# Patient Record
Sex: MALE | Race: OTHER
[De-identification: names, ages, dates, MRNs, and addresses within clinical notes are randomized per-mention and may not be internally consistent; named-entity substitution may affect disease eponyms.]

---

## 2018-07-06 ENCOUNTER — HOSPITAL ENCOUNTER (INPATIENT)
Dept: HOSPITAL 54 - GPS | Age: 63
LOS: 1 days | Discharge: ANOTHER HOSPITAL | DRG: 885 | End: 2018-07-07
Attending: PSYCHIATRY & NEUROLOGY | Admitting: PSYCHIATRY & NEUROLOGY
Payer: MEDICARE

## 2018-07-06 VITALS — HEIGHT: 68 IN | WEIGHT: 172 LBS | BODY MASS INDEX: 26.07 KG/M2

## 2018-07-06 VITALS — SYSTOLIC BLOOD PRESSURE: 120 MMHG | DIASTOLIC BLOOD PRESSURE: 68 MMHG

## 2018-07-06 DIAGNOSIS — F39: Primary | ICD-10-CM

## 2018-07-06 DIAGNOSIS — Z72.0: ICD-10-CM

## 2018-07-06 DIAGNOSIS — G40.909: ICD-10-CM

## 2018-07-06 DIAGNOSIS — F03.90: ICD-10-CM

## 2018-07-06 DIAGNOSIS — Z73.6: ICD-10-CM

## 2018-07-06 DIAGNOSIS — F29: ICD-10-CM

## 2018-07-06 DIAGNOSIS — Z91.81: ICD-10-CM

## 2018-07-06 DIAGNOSIS — F20.9: ICD-10-CM

## 2018-07-06 PROCEDURE — Z7610: HCPCS

## 2018-07-06 NOTE — NUR
Admitted a 61 y/o male from [facility], on 5250 hold, based on hold patient has hx. of 
multiple inpatient hospitalization for psychotic d/o and presents with disorganized 
thinking, grandiose delusions. He is currently unable to state how he would obtain food or 
clothing. Patient also hides under the sheets of hospital bed and refused to speak with 
medical team. Patient admitting Dx. of psychosis and medical Dx. Rhabdomyolysis, blunt head 
trauma and AMS. Upon face to face evaluation, patient appeared alert and oriented x 3, 
anxious, disorganized, guarded, paranoid, with episodes of poor concentration and confusion, 
jumping from topic to topic, policies and procedure explained, cooperative on head to toe 
assessment and pictures, refused to sign paper works, patient has no sob, no acute distress, 
breathing even and unlabored, denies pain and discomfort, ambulatory with unsteady gait, 
belongings inspected for contraband checking, notified Dr. Abdul of the admission. Kept 
clean, dry and comfortable. Will continue to monitor [id number] for safety

## 2018-07-07 ENCOUNTER — HOSPITAL ENCOUNTER (INPATIENT)
Dept: HOSPITAL 54 - TELE1 | Age: 63
LOS: 3 days | Discharge: PSYCHIATRIC OR SUBSTANCE USE FACILITY | DRG: 101 | End: 2018-07-10
Attending: NURSE PRACTITIONER | Admitting: NURSE PRACTITIONER
Payer: MEDICARE

## 2018-07-07 VITALS — DIASTOLIC BLOOD PRESSURE: 62 MMHG | SYSTOLIC BLOOD PRESSURE: 106 MMHG

## 2018-07-07 VITALS — DIASTOLIC BLOOD PRESSURE: 60 MMHG | SYSTOLIC BLOOD PRESSURE: 101 MMHG

## 2018-07-07 VITALS — SYSTOLIC BLOOD PRESSURE: 96 MMHG | DIASTOLIC BLOOD PRESSURE: 58 MMHG

## 2018-07-07 VITALS — DIASTOLIC BLOOD PRESSURE: 68 MMHG | SYSTOLIC BLOOD PRESSURE: 120 MMHG

## 2018-07-07 VITALS — DIASTOLIC BLOOD PRESSURE: 62 MMHG | SYSTOLIC BLOOD PRESSURE: 100 MMHG

## 2018-07-07 VITALS — SYSTOLIC BLOOD PRESSURE: 92 MMHG | DIASTOLIC BLOOD PRESSURE: 46 MMHG

## 2018-07-07 VITALS — BODY MASS INDEX: 26.07 KG/M2 | WEIGHT: 172 LBS | HEIGHT: 68 IN

## 2018-07-07 VITALS — DIASTOLIC BLOOD PRESSURE: 73 MMHG | SYSTOLIC BLOOD PRESSURE: 144 MMHG

## 2018-07-07 DIAGNOSIS — E87.1: ICD-10-CM

## 2018-07-07 DIAGNOSIS — F03.90: ICD-10-CM

## 2018-07-07 DIAGNOSIS — Z87.828: ICD-10-CM

## 2018-07-07 DIAGNOSIS — Z73.6: ICD-10-CM

## 2018-07-07 DIAGNOSIS — I10: ICD-10-CM

## 2018-07-07 DIAGNOSIS — T42.6X5A: ICD-10-CM

## 2018-07-07 DIAGNOSIS — G40.909: Primary | ICD-10-CM

## 2018-07-07 DIAGNOSIS — Z72.0: ICD-10-CM

## 2018-07-07 DIAGNOSIS — F39: ICD-10-CM

## 2018-07-07 DIAGNOSIS — F20.0: ICD-10-CM
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
## 2018-07-07 DIAGNOSIS — F29: ICD-10-CM

## 2018-07-07 LAB
ALBUMIN SERPL BCP-MCNC: 3.3 G/DL (ref 3.4–5)
ALBUMIN SERPL BCP-MCNC: 3.4 G/DL (ref 3.4–5)
ALP SERPL-CCNC: 89 U/L (ref 46–116)
ALP SERPL-CCNC: 91 U/L (ref 46–116)
ALT SERPL W P-5'-P-CCNC: 16 U/L (ref 12–78)
ALT SERPL W P-5'-P-CCNC: 18 U/L (ref 12–78)
APPEARANCE UR: CLEAR
AST SERPL W P-5'-P-CCNC: 14 U/L (ref 15–37)
AST SERPL W P-5'-P-CCNC: 17 U/L (ref 15–37)
BASOPHILS # BLD AUTO: 0.1 /CMM (ref 0–0.2)
BASOPHILS # BLD AUTO: 0.1 /CMM (ref 0–0.2)
BASOPHILS NFR BLD AUTO: 0.5 % (ref 0–2)
BASOPHILS NFR BLD AUTO: 0.9 % (ref 0–2)
BILIRUB SERPL-MCNC: 0.4 MG/DL (ref 0.2–1)
BILIRUB SERPL-MCNC: 0.7 MG/DL (ref 0.2–1)
BILIRUB UR QL STRIP: NEGATIVE
BUN SERPL-MCNC: 15 MG/DL (ref 7–18)
BUN SERPL-MCNC: 18 MG/DL (ref 7–18)
CALCIUM SERPL-MCNC: 8.9 MG/DL (ref 8.5–10.1)
CALCIUM SERPL-MCNC: 9.2 MG/DL (ref 8.5–10.1)
CHLORIDE SERPL-SCNC: 97 MMOL/L (ref 98–107)
CHLORIDE SERPL-SCNC: 98 MMOL/L (ref 98–107)
CHOLEST SERPL-MCNC: 138 MG/DL (ref ?–200)
CHOLEST SERPL-MCNC: 148 MG/DL (ref ?–200)
CK MB SERPL-MCNC: 0.7 NG/ML (ref 0–3.6)
CO2 SERPL-SCNC: 19 MMOL/L (ref 21–32)
CO2 SERPL-SCNC: 27 MMOL/L (ref 21–32)
COLOR UR: YELLOW
CREAT SERPL-MCNC: 0.7 MG/DL (ref 0.6–1.3)
CREAT SERPL-MCNC: 0.9 MG/DL (ref 0.6–1.3)
EOSINOPHIL NFR BLD AUTO: 0.5 % (ref 0–6)
EOSINOPHIL NFR BLD AUTO: 3.6 % (ref 0–6)
GLUCOSE SERPL-MCNC: 110 MG/DL (ref 74–106)
GLUCOSE SERPL-MCNC: 115 MG/DL (ref 74–106)
GLUCOSE UR STRIP-MCNC: NEGATIVE MG/DL
HCT VFR BLD AUTO: 40 % (ref 39–51)
HCT VFR BLD AUTO: 42 % (ref 39–51)
HDLC SERPL-MCNC: 32 MG/DL (ref 40–60)
HDLC SERPL-MCNC: 36 MG/DL (ref 40–60)
HGB BLD-MCNC: 13.2 G/DL (ref 13.5–17.5)
HGB BLD-MCNC: 14.2 G/DL (ref 13.5–17.5)
HGB UR QL STRIP: NEGATIVE ERY/UL
KETONES UR STRIP-MCNC: NEGATIVE MG/DL
LDLC SERPL DIRECT ASSAY-MCNC: 101 MG/DL (ref 0–99)
LDLC SERPL DIRECT ASSAY-MCNC: 93 MG/DL (ref 0–99)
LEUKOCYTE ESTERASE UR QL STRIP: NEGATIVE
LYMPHOCYTES NFR BLD AUTO: 1.3 /CMM (ref 0.8–4.8)
LYMPHOCYTES NFR BLD AUTO: 11.2 % (ref 20–44)
LYMPHOCYTES NFR BLD AUTO: 38.2 % (ref 20–44)
LYMPHOCYTES NFR BLD AUTO: 4.5 /CMM (ref 0.8–4.8)
MAGNESIUM SERPL-MCNC: 2 MG/DL (ref 1.8–2.4)
MCH RBC QN AUTO: 33 PG (ref 26–33)
MCH RBC QN AUTO: 35 PG (ref 26–33)
MCHC RBC AUTO-ENTMCNC: 33 G/DL (ref 31–36)
MCHC RBC AUTO-ENTMCNC: 34 G/DL (ref 31–36)
MCV RBC AUTO: 101 FL (ref 80–96)
MCV RBC AUTO: 103 FL (ref 80–96)
MONOCYTES NFR BLD AUTO: 0.8 /CMM (ref 0.1–1.3)
MONOCYTES NFR BLD AUTO: 1.1 /CMM (ref 0.1–1.3)
MONOCYTES NFR BLD AUTO: 6.4 % (ref 2–12)
MONOCYTES NFR BLD AUTO: 9.5 % (ref 2–12)
NEUTROPHILS # BLD AUTO: 5.6 /CMM (ref 1.8–8.9)
NEUTROPHILS # BLD AUTO: 9.6 /CMM (ref 1.8–8.9)
NEUTROPHILS NFR BLD AUTO: 47.8 % (ref 43–81)
NEUTROPHILS NFR BLD AUTO: 81.4 % (ref 43–81)
NITRITE UR QL STRIP: NEGATIVE
PH UR STRIP: 6 [PH] (ref 5–8)
PHOSPHATE SERPL-MCNC: 3.1 MG/DL (ref 2.5–4.9)
PLATELET # BLD AUTO: 230 /CMM (ref 150–450)
PLATELET # BLD AUTO: 242 /CMM (ref 150–450)
POTASSIUM SERPL-SCNC: 4.3 MMOL/L (ref 3.5–5.1)
POTASSIUM SERPL-SCNC: 4.6 MMOL/L (ref 3.5–5.1)
PROT SERPL-MCNC: 6.5 G/DL (ref 6.4–8.2)
PROT SERPL-MCNC: 7 G/DL (ref 6.4–8.2)
PROT UR QL STRIP: NEGATIVE MG/DL
RBC # BLD AUTO: 3.94 MIL/UL (ref 4.5–6)
RBC # BLD AUTO: 4.09 MIL/UL (ref 4.5–6)
RDW COEFFICIENT OF VARIATION: 13.2 (ref 11.5–15)
RDW COEFFICIENT OF VARIATION: 13.3 (ref 11.5–15)
SODIUM SERPL-SCNC: 129 MMOL/L (ref 136–145)
SODIUM SERPL-SCNC: 131 MMOL/L (ref 136–145)
TRIGL SERPL-MCNC: 70 MG/DL (ref 30–150)
TRIGL SERPL-MCNC: 88 MG/DL (ref 30–150)
TSH SERPL DL<=0.005 MIU/L-ACNC: 1.08 UIU/ML (ref 0.36–3.74)
UROBILINOGEN UR STRIP-MCNC: 0.2 EU/DL
WBC NRBC COR # BLD AUTO: 11.7 K/UL (ref 4.3–11)
WBC NRBC COR # BLD AUTO: 11.9 K/UL (ref 4.3–11)

## 2018-07-07 RX ADMIN — DIVALPROEX SODIUM SCH MG: 500 TABLET, DELAYED RELEASE ORAL at 16:30

## 2018-07-07 RX ADMIN — SODIUM CHLORIDE SCH MLS/HR: 9 INJECTION, SOLUTION INTRAVENOUS at 19:38

## 2018-07-07 NOTE — NUR
GPS RN NOTE:



PATIENT NOTED WITH EPISODE OF SEIZURE  LASTED 3 MINUTES, V/S YY=465/80 P=57 R=22 O2 SAT 96% 
ROOM AIR, SAFETY PRECAUTIONS OBSERVED, PATIENT ABLE TO CONVERSE AFTER. PATIENT HAD A HX. OF 
SEIZURE.  NOTIFIED DR. MADSION  AND ORDER TO CONTINUE DEPAKOTE 500MG PO BID NOTED AND 
CARRIED OUT. WILL CONTINUE TO MONITOR

-------------------------------------------------------------------------------

Addendum: 07/07/18 at 0658 by PRESTON HUMPHREY II, RN

-------------------------------------------------------------------------------

ADDENDUM:

RN SUPERVISOR AWARE

## 2018-07-07 NOTE — NUR
[facility]AL[facility] RN NOTES



RECEIVED PT FROM GPS, AOX3, PER NICOLE NP, ADMIT TO [facility] DX SEZURE. PT HAD 1 EPISODE OF 
SEIZURE AN HOUR AGO, ON RA, AO X 3, AMBULATORY, DENIES PAIN, ON TELEMONITOR HR SR HR 83, 
STARTED IV TO RT HAND G20, FLUSHES WELL, SITE CLEAR, REGULAR DIET, REFUSE PHOTO OF SKIN 
ISSUES AND TOTAL BODY CHECK. ON 5250 HOLD [EXP 07/20], SITTER AT BEDSIDE. UNIT ORIENTATION 
DONE AND USE OF CALL LIGHT, BED LOW LOCKED, FALL PRECAUTION, SEIZURE PRECAUTION, WILL CONT 
TO MONITOR.

## 2018-07-07 NOTE — NUR
TELE RN CLOSING NOTES



PT IN BED, RESTING COMFORTABLY, FROM GPS, AOX3, PER NICOLE NP, AMBULATORY, DENIES PAIN, ON 
TELEMONITOR HR SR HR 60, SRT HAND G20 IVHL, FLUSHES WELL, SITE CLEAR, REGULAR DIET, 
INDEPENDENT OF BED MOBILITY, ON 5250 HOLD [EXP 07/20], SITTER AT BEDSIDE. CALL LIGHT WITHIN 
REACH, BED LOW LOCKED, FALL PRECAUTION, SEIZURE PRECAUTION, ALL NEEDS MET AT THIS TIME. WILL 
ENDORSE TO NEXT SHIFT FOR BRANDAN.

## 2018-07-07 NOTE — NUR
GPS/VICENTE SHEPHERD NP ASSESSED THE PT FOLLOWING SEIZURE EPISODE WITH RAPID RESPONSE. NEW ORDERS 
GIVEN AND CARRIED OUT.

## 2018-07-07 NOTE — NUR
GPS/RN PT TRANSFERRED TO [facility] REPORT GIVEN TO NATE ZHANG AT THE BED SIDE. PT IS A/O NO ACUTE 
DISTRESS NO SEIZURES. VSS. ORIGINAL HOLDS GIVEN TO CHARGE NURSE/UNIT SECRETARY.

## 2018-07-07 NOTE — NUR
DR. SPRINGER NOTIFIED ABOUT THE PT. TO TRANSFER TO [facility] AND ORDERED TO D/C PT. TO [facility] AND TO 
CONTINUE ON 14 DAY HOLD.

## 2018-07-08 VITALS — DIASTOLIC BLOOD PRESSURE: 60 MMHG | SYSTOLIC BLOOD PRESSURE: 114 MMHG

## 2018-07-08 VITALS — SYSTOLIC BLOOD PRESSURE: 101 MMHG | DIASTOLIC BLOOD PRESSURE: 62 MMHG

## 2018-07-08 VITALS — SYSTOLIC BLOOD PRESSURE: 114 MMHG | DIASTOLIC BLOOD PRESSURE: 59 MMHG

## 2018-07-08 VITALS — DIASTOLIC BLOOD PRESSURE: 75 MMHG | SYSTOLIC BLOOD PRESSURE: 115 MMHG

## 2018-07-08 VITALS — DIASTOLIC BLOOD PRESSURE: 50 MMHG | SYSTOLIC BLOOD PRESSURE: 95 MMHG

## 2018-07-08 LAB
BASOPHILS # BLD AUTO: 0 /CMM (ref 0–0.2)
BASOPHILS NFR BLD AUTO: 0.4 % (ref 0–2)
BUN SERPL-MCNC: 12 MG/DL (ref 7–18)
CALCIUM SERPL-MCNC: 8.9 MG/DL (ref 8.5–10.1)
CHLORIDE SERPL-SCNC: 100 MMOL/L (ref 98–107)
CO2 SERPL-SCNC: 25 MMOL/L (ref 21–32)
CREAT SERPL-MCNC: 0.6 MG/DL (ref 0.6–1.3)
EOSINOPHIL NFR BLD AUTO: 1.5 % (ref 0–6)
GLUCOSE SERPL-MCNC: 98 MG/DL (ref 74–106)
HCT VFR BLD AUTO: 38 % (ref 39–51)
HGB BLD-MCNC: 12.8 G/DL (ref 13.5–17.5)
LYMPHOCYTES NFR BLD AUTO: 2.1 /CMM (ref 0.8–4.8)
LYMPHOCYTES NFR BLD AUTO: 24.5 % (ref 20–44)
MAGNESIUM SERPL-MCNC: 2.2 MG/DL (ref 1.8–2.4)
MCH RBC QN AUTO: 34 PG (ref 26–33)
MCHC RBC AUTO-ENTMCNC: 34 G/DL (ref 31–36)
MCV RBC AUTO: 102 FL (ref 80–96)
MONOCYTES NFR BLD AUTO: 0.9 /CMM (ref 0.1–1.3)
MONOCYTES NFR BLD AUTO: 10.2 % (ref 2–12)
NEUTROPHILS # BLD AUTO: 5.5 /CMM (ref 1.8–8.9)
NEUTROPHILS NFR BLD AUTO: 63.4 % (ref 43–81)
PHOSPHATE SERPL-MCNC: 3.4 MG/DL (ref 2.5–4.9)
PLATELET # BLD AUTO: 226 /CMM (ref 150–450)
POTASSIUM SERPL-SCNC: 4.4 MMOL/L (ref 3.5–5.1)
RBC # BLD AUTO: 3.73 MIL/UL (ref 4.5–6)
RDW COEFFICIENT OF VARIATION: 13.2 (ref 11.5–15)
SODIUM SERPL-SCNC: 133 MMOL/L (ref 136–145)
VALPROATE SERPL-MCNC: 36 UG/ML (ref 50–100)
WBC NRBC COR # BLD AUTO: 8.7 K/UL (ref 4.3–11)

## 2018-07-08 RX ADMIN — NICOTINE SCH MG: 14 PATCH TRANSDERMAL at 08:50

## 2018-07-08 RX ADMIN — SODIUM CHLORIDE SCH MLS/HR: 9 INJECTION, SOLUTION INTRAVENOUS at 08:49

## 2018-07-08 RX ADMIN — SODIUM CHLORIDE SCH MLS/HR: 9 INJECTION, SOLUTION INTRAVENOUS at 22:56

## 2018-07-08 RX ADMIN — OLANZAPINE SCH MG: 5 TABLET, ORALLY DISINTEGRATING ORAL at 21:09

## 2018-07-08 RX ADMIN — DIVALPROEX SODIUM SCH MG: 500 TABLET, DELAYED RELEASE ORAL at 16:06

## 2018-07-08 RX ADMIN — DIVALPROEX SODIUM SCH MG: 500 TABLET, DELAYED RELEASE ORAL at 08:49

## 2018-07-08 RX ADMIN — Medication SCH MG: at 08:50

## 2018-07-08 NOTE — NUR
RN TELE NOTE 



PT ATTEMPTING TO HIT SITTER, REBECCA KRISHNAMURTHY CALLED TO PLACE PATIENT BACK IN BED, CALL TO DR MADISON FOR FURTHER ORDERS, JHONNY SOFT WRIST RESTRAINTS IN PLACE FOR PT SAFETY AND ATIVAN 
1MG PRN FOR SEIZURE ADDED.

## 2018-07-08 NOTE — NUR
TELE RN INITIAL NOTES



RECEIVED PT  IN BED, RESTING COMFORTABLY. PT IS A/O X2. RESPIRATIONS ARE EVEN AND UNLABORED, 
DENIES SOB. NO FACIAL GRIMACING OR MOANING NOTED. IV ACCESS INTACT, NO INFILTRATION NOTED, 
DRESSING KEPT CLEAN AND DRY. RM ASSIGNED TO 1:1 SITTER, NO RESTRAINS APPLIED AT THIS TIME. 
SAFETY MEASURES ARE IN PLACE. WILL CONTINUE TO MONITOR THROUGHOUT SHIFT.

## 2018-07-08 NOTE — NUR
TELE RN CLOSING NOTES



ALL DUE MEDS GIVEN, NEEDS MET AND RENDERED. PT A/O X2-3, AFEBRILE. RESPIRATIONS ARE EVEN AND 
UNLABORED, NOT IN ANY ACUTE DISTRESS NOTED. IV SITE IS INTACT, NO INFILTRATION NOTED. IV 
FLUIDS RUNNING AND TOLERATING WELL. PT REMAINS COOPERATIVE AND FOLLOWS SIMPLE INSTRUCTIONS. 
RESTRAINTS HAVE BEEN REMOVED ONE HAND AT A TIME. NO SKIN ISSUES NOTED. SAFETY MEASURES ARE 
IN PLACE. SITTER AT BEDSIDE FOR MONITORING. WILL ENDORSE TO NEXT SHIFT FOR CONTINUITY OF 
CARE.

## 2018-07-08 NOTE — NUR
TELE RN OPENING NOTES



RECEIVED PT SITTING UPRIGHT IN BED, RESTING COMFORTABLY. PT IS A/O X2. RESPIRATIONS ARE EVEN 
AND UNLABORED, NOT IN ANY ACUTE DISTRESS NOTED. NO FACIAL GRIMACING OR MOANING NOTED. IV 
ACCESS INTACT, NO INFILTRATION NOTED, DRESSING KEPT CLEAN AND DRY. BILATERAL WIRST RESTRAINS 
NOTED SECURE. WILL REPOSITION PER PROTOCOL. SAFETY MEASURES ARE IN PLACE. WILL CONTINUE TO 
MONITOR THROUGHOUT SHIFT.

## 2018-07-08 NOTE — NUR
RN TELE NOTE 



PT IS STRIKING OUT, VERBALLY ABUSIVE TOWARDS STAFF, ATTEMPTING TO GET OUT OF BED, TALKING TO 
SELF, REDIRECTED AS NEEDED, NEEDS CONSTANT REINFORCEMENT SITTER AT BEDSIDE.

## 2018-07-08 NOTE — NUR
TELE RN NOTES



PT IS NOT IN ANY APPARENT DISTRESS AT THIS TIME. PT REMAINS COOPERATIVE. WRIST RESTRAINTS 
ARE REMOVED ONE AT A TIME FOR GOOD CIRCULATION AND REFRAIN FROM ANY SKIN ISSUES. SITTER AT 
BEDSIDE. WILL CONTINUE TO MONITOR THROUGHOUT SHIFT.

## 2018-07-09 VITALS — SYSTOLIC BLOOD PRESSURE: 97 MMHG | DIASTOLIC BLOOD PRESSURE: 63 MMHG

## 2018-07-09 VITALS — SYSTOLIC BLOOD PRESSURE: 104 MMHG | DIASTOLIC BLOOD PRESSURE: 66 MMHG

## 2018-07-09 VITALS — SYSTOLIC BLOOD PRESSURE: 122 MMHG | DIASTOLIC BLOOD PRESSURE: 77 MMHG

## 2018-07-09 VITALS — DIASTOLIC BLOOD PRESSURE: 65 MMHG | SYSTOLIC BLOOD PRESSURE: 99 MMHG

## 2018-07-09 VITALS — DIASTOLIC BLOOD PRESSURE: 70 MMHG | SYSTOLIC BLOOD PRESSURE: 106 MMHG

## 2018-07-09 RX ADMIN — DIVALPROEX SODIUM SCH MG: 250 TABLET, DELAYED RELEASE ORAL at 16:29

## 2018-07-09 RX ADMIN — OLANZAPINE SCH MG: 5 TABLET, ORALLY DISINTEGRATING ORAL at 21:21

## 2018-07-09 RX ADMIN — NICOTINE SCH MG: 14 PATCH TRANSDERMAL at 08:46

## 2018-07-09 RX ADMIN — DIVALPROEX SODIUM SCH MG: 250 TABLET, DELAYED RELEASE ORAL at 08:44

## 2018-07-09 RX ADMIN — Medication SCH MG: at 09:00

## 2018-07-09 RX ADMIN — SODIUM CHLORIDE SCH MLS/HR: 9 INJECTION, SOLUTION INTRAVENOUS at 11:40

## 2018-07-09 NOTE — NUR
DISCHARGE:

PT DISCHARGED FROM [facility] AND WILL BE ADMITTED UNDER [facility]. WILL REMAIN IN 
THE SAME ROOM WITH 1:1 SITTER AT BEDSIDE. PT DENIES ANY PAIN OR DISCOMFORT AT THIS TIME. 
DISCHARGE EDUCATION GIVEN TO PATIENT. PT NOT ABLE TO FULLY COMPREHEND TREATMENT PLAN, BUT 
STATES HE HAS NO FURTHER QUESTIONS.

-------------------------------------------------------------------------------

Addendum: 07/09/18 at 1908 by RUSS GUTIERREZ RN

-------------------------------------------------------------------------------

WILL ENDORSE TO PM SHIFT FOR ADMITTING PT TO GPS.

## 2018-07-09 NOTE — NUR
WOUND CARE NURSE AT BEDSIDE TO ASSESS PT. NOTED WITH CALLUSES TO BILAT PLANTAR FEET. NO 
TREATMENT TO BE DONE. PICTURES TAKEN AND PLACED IN CHART.

## 2018-07-09 NOTE — NUR
VS:

PT REFUSING 1600 CAROLE SIGNS

-------------------------------------------------------------------------------

Addendum: 07/09/18 at 1633 by RUSS GUTIERREZ RN

-------------------------------------------------------------------------------

Amended: Links added.

## 2018-07-09 NOTE — NUR
WOUND CARE CONSULT: PT SEEN FOR SKIN ASSESSMENT AND NOTED TO HAVE CALLUSES TO BILATERAL 
PLANTAR FEET. PT STATES THAT HE WALKED A LOT PRIOR TO ADMISSION. NO DRAINAGE NOTED. PT ALSO 
NOTED TO HAVE DRY HEALED AREA TO RT SIDE OF HEAD.  PT INDEPENDENT WITH BED MOBILITY AND 
CONTINENT AT THIS TIME. WILL SEE PRN.

## 2018-07-09 NOTE — NUR
RN TELE INITIAL NOTES:

RECEIVED PT IN BED SLEEPING, EASY TO AROUSE. ON ROOM AIR, SATURATING WELL. NO RESPIRATORY 
DISTRESS NOTED AT THIS TIME. ON TELE MONITOR SR HR 66. 1:1 SITTER IN ROOM AS ORDERED FOR PT 
SAFETY. IV TO LFA #20GAUGE WITH IV FLUIDS RUNNING AT 75ML/HR AS ORDERED. BED IN LOW LOCKED 
POSITION, CALL LIGHT WITHIN REACH. PLAN OF CARE DISCUSSED WITH PT. WILL CONTINUE TO MONITOR.

## 2018-07-09 NOTE — NUR
PT TOOK SHOWER WITH ASSISTANCE. SAFELY TAKEN BACK TO BED. PT NOW REFUSING TO HAVE IV FLUIDS 
CONNECTED AS WELL AS TELE MONITOR ON. NICOLE HERNÁNDEZ NOTIFIED

## 2018-07-09 NOTE — NUR
MS RN NOTE



PT RECEIVED AWAKE AND ALERT WITH SITTER AT BEDSIDE. NO ACUTE DISTRESS NOTED. PT REFUSING IV 
FLUIDS. CALL LIGHT WITHIN REACH. ABLE TO AMBULATE TO THE RESTROOM WITH STEADY GAIT. CLOSE TO 
NURSING STATION. WILL MONITOR CLOSELY.

## 2018-07-09 NOTE — NUR
RN TELE END NOTES:

PT REMAINS IN BED, AWAKE A&O X2. 1:1 SITTER IN ROOM AS ORDERED. PT NON COMBATIVE THIS SHIFT, 
BUT NON-COMPLIANT WITH ALL TREATMENTS. NP NICOLE MADE AWARE. BED IN LOW LOCKED POSITION, 
CALL LIGHT WITHIN REACH. WILL ENDORSE TO PM SHIFT FOR CONTINUITY OF CARE.

## 2018-07-09 NOTE — NUR
TELE RN CLOSING NOTES



ENDORSED PT  IN BED, RESTING COMFORTABLY. PT IS A/O X2. RESPIRATIONS ARE EVEN AND UNLABORED, 
DENIES SOB. NO FACIAL GRIMACING OR MOANING NOTED. IV ACCESS INTACT, NO INFILTRATION NOTED, 
DRESSING KEPT CLEAN AND DRY. RM ASSIGNED TO 1:1 SITTER,  RESTRAINS DC AT THIS TIME. SAFETY 
MEASURES ARE IN PLACE. WILL CONTINUE TO MONITOR THROUGHOUT SHIFT.

## 2018-07-10 ENCOUNTER — HOSPITAL ENCOUNTER (INPATIENT)
Dept: HOSPITAL 54 - GPSOV1 | Age: 63
LOS: 8 days | Discharge: NURSING, REHAB OR LONG-TERM CARE | DRG: 885 | End: 2018-07-18
Attending: PSYCHIATRY & NEUROLOGY | Admitting: PSYCHIATRY & NEUROLOGY
Payer: MEDICARE

## 2018-07-10 VITALS — DIASTOLIC BLOOD PRESSURE: 49 MMHG | SYSTOLIC BLOOD PRESSURE: 89 MMHG

## 2018-07-10 VITALS — SYSTOLIC BLOOD PRESSURE: 110 MMHG | DIASTOLIC BLOOD PRESSURE: 65 MMHG

## 2018-07-10 VITALS — HEIGHT: 68 IN | WEIGHT: 151 LBS | BODY MASS INDEX: 22.88 KG/M2

## 2018-07-10 VITALS — SYSTOLIC BLOOD PRESSURE: 97 MMHG | DIASTOLIC BLOOD PRESSURE: 42 MMHG

## 2018-07-10 DIAGNOSIS — G40.909: ICD-10-CM

## 2018-07-10 DIAGNOSIS — Z87.39: ICD-10-CM

## 2018-07-10 DIAGNOSIS — Z73.6: ICD-10-CM

## 2018-07-10 DIAGNOSIS — F43.9: ICD-10-CM

## 2018-07-10 DIAGNOSIS — Z86.59: ICD-10-CM

## 2018-07-10 DIAGNOSIS — F39: ICD-10-CM

## 2018-07-10 DIAGNOSIS — F29: Primary | ICD-10-CM

## 2018-07-10 DIAGNOSIS — Z91.81: ICD-10-CM

## 2018-07-10 DIAGNOSIS — F32.9: ICD-10-CM

## 2018-07-10 DIAGNOSIS — Z72.0: ICD-10-CM

## 2018-07-10 DIAGNOSIS — F41.9: ICD-10-CM

## 2018-07-10 DIAGNOSIS — F03.90: ICD-10-CM

## 2018-07-10 DIAGNOSIS — I10: ICD-10-CM

## 2018-07-10 PROCEDURE — Z7610: HCPCS

## 2018-07-10 RX ADMIN — DIVALPROEX SODIUM SCH MG: 250 TABLET, DELAYED RELEASE ORAL at 09:56

## 2018-07-10 RX ADMIN — NICOTINE SCH MG: 14 PATCH TRANSDERMAL at 09:00

## 2018-07-10 RX ADMIN — NICOTINE SCH MG: 14 PATCH TRANSDERMAL at 08:19

## 2018-07-10 RX ADMIN — DIVALPROEX SODIUM SCH MG: 250 TABLET, DELAYED RELEASE ORAL at 08:25

## 2018-07-10 RX ADMIN — DIVALPROEX SODIUM SCH MG: 500 TABLET, DELAYED RELEASE ORAL at 16:41

## 2018-07-10 RX ADMIN — Medication SCH MG: at 08:20

## 2018-07-10 NOTE — NUR
INITIAL GPS OVERFLOW NOTE

ADMITTED 62 YEAR OLD MALE ON 5250 HOLD FOR GD. PER 5250 SUMMARY EZE CHARLES HAS A HISTORY OF 
MULTIPLE INPATIENT HOSPITALIZATIONS FOR PSYCHOTIC DISORDER AND PRESENTS WITH DISORGANIZED 
THINKING AND GRANDIOSE DELUSIONS. HE IS CURRENTLY UNABLE TO STATE HOW HE OBTAIN FOOD OR 
CLOTHING STATING " THAT'S ONE STEP AHEAD OF ME." HE IS ALSO UNABLE TO ENGAGE IN MEDICAL 
CARE; HE HIDES UNDER THE SHEETS OF HOSPITAL BED AND REFUSES TO SPEAK WITH MEDICAL TEAM. ON 
1:1 PATIENT IS ALERT X2 EASILY AGITATED FLAT AFFECT, DEPRESSED . MEDICAL DIAGNOSES SEIZURE, 
ACUTE PSYCHOSIS, HTN, DEMENTIA, SMOKER, MOOD DISORDER, AND GRAVE DISABILITY 5250. DR. SPRINGER/ FRANKLIN CALZADA N.P. NOTIFIED OF ADMISSION. PATIENT'S RIGHT HANDBOOK GIVEN AND 
EXPLAINED. PATIENT UNABLE TO VERBALIZE UNDERSTANDING. PATIENT HAS SITTER AT BEDSIDE AND WILL 
BEGIN Q15 SAFETY CHECKS.

## 2018-07-10 NOTE — NUR
MICHA OVERFLOW RN NOTE

REMOVED IV  DUE TO PATIENT STATING IT'S PAINFUL. REFUSED NEW INSERTION.  PATIENT EASILY 
AGITATED. CHARGE NURSE ERICK NOTIFIED.

## 2018-07-10 NOTE — NUR
INITIAL MED SURG NOTE

PATIENT A/O X2 PATIENT CALM AND COOPERATIVE. RESTING COMFORTABLY ALL SAFETY PRECAUTIONS IN 
PLACE SITTER AT BED SIDE WILL CONTINUE TO MONITOR CLOSELY. LFA #20 PATENT AND INTACT.

## 2018-07-10 NOTE — NUR
MED SURG RN NOTE

PATIENT REFUSING DEPAKOTE 750 MG AND NICODERM PATCH. PATIENT STATED MA'AM I DON'T TAKE THAT 
MEDICATION ASK MY NEIGHBOR. i EXPLAINED IT WAS FOR SEIZURES AND HE STATED I DON'T HAVE 
SEIZURES. WILL ATTEMPT LATER.

## 2018-07-10 NOTE — NUR
GPS OVERFLOW RN NOTE

PATIENT REFUSED TO SIGN ADMISSION PAPERWORK. PATIENT REFUSED TO TAKE CURRENT PICTURES TO 
UPDATE HIS CHART. PATIENT TRANSFERRED TO GPS OVERFLOW AND WILL STAY IN SAME ROOM PATIENT WAS 
IN PREVIOUSLY. 115-1. SITTER AT BEDSIDE. WILL CONTINUE TO MONITOR CLOSELY.

## 2018-07-10 NOTE — NUR
MED SURG RN NOTE

PATIENT REFUSING IV NS @ 75 ML/HR. ATTEMPTED TO PLACE NICODERM PATCH PATIENT REFUSED 
RETURNED MEDICATION TO MEDICATION RETURN BIN.

## 2018-07-10 NOTE — NUR
GPS OVERFLOW RN CLOSING NOTE

REPORT GIVEN TO KATIE ZHANG.  PATIENT CLOSELY MONITORED EASILY AGITATED THROUGHOUT SHIFT. 
PATIENT REFUSED IV INSERTION FRANKLIN GUTIERREZ.ROSARIO NOTIFIED. PATIENT CLEAN AND DRY RESTING 
COMFORTABLY IN BED. SITTER AT BEDSIDE. ALL ORDERS CARRIED OUT.

## 2018-07-10 NOTE — NUR
GPS OVERFLOW RN NOTE 



RECEIVED PT AOX2 SPEECH CLEAR, AGITATED, SITTER AT BEDSIDE, GUARDED, VERBALLY ABUSIVE ON 
INTRODUCTION, NO S/SX OF CARDIAC OR RESPIRATORY DISTRESS, ON ROOM AIR,  SKIN KEPT CLEAN AND 
DRY. SAFETY MAINTAINED AT ALL TIMES, BED IN LOCKED, LOW POSITION, CALL LIGHT WITHIN REACH, 
WILL CONTINUE TO MONITOR FOR ANY CHANGES IN CONDITION.

## 2018-07-11 VITALS — SYSTOLIC BLOOD PRESSURE: 117 MMHG | DIASTOLIC BLOOD PRESSURE: 69 MMHG

## 2018-07-11 LAB
ALBUMIN SERPL BCP-MCNC: 3.3 G/DL (ref 3.4–5)
ALP SERPL-CCNC: 91 U/L (ref 46–116)
ALT SERPL W P-5'-P-CCNC: 10 U/L (ref 12–78)
AST SERPL W P-5'-P-CCNC: 10 U/L (ref 15–37)
BILIRUB SERPL-MCNC: 0.3 MG/DL (ref 0.2–1)
BUN SERPL-MCNC: 16 MG/DL (ref 7–18)
CALCIUM SERPL-MCNC: 8.9 MG/DL (ref 8.5–10.1)
CHLORIDE SERPL-SCNC: 97 MMOL/L (ref 98–107)
CHOLEST SERPL-MCNC: 132 MG/DL (ref ?–200)
CO2 SERPL-SCNC: 25 MMOL/L (ref 21–32)
CREAT SERPL-MCNC: 0.8 MG/DL (ref 0.6–1.3)
GLUCOSE SERPL-MCNC: 120 MG/DL (ref 74–106)
HDLC SERPL-MCNC: 29 MG/DL (ref 40–60)
LDLC SERPL DIRECT ASSAY-MCNC: 87 MG/DL (ref 0–99)
POTASSIUM SERPL-SCNC: 4.6 MMOL/L (ref 3.5–5.1)
PROT SERPL-MCNC: 6.7 G/DL (ref 6.4–8.2)
SODIUM SERPL-SCNC: 131 MMOL/L (ref 136–145)
TRIGL SERPL-MCNC: 72 MG/DL (ref 30–150)

## 2018-07-11 RX ADMIN — NICOTINE SCH MG: 14 PATCH TRANSDERMAL at 09:45

## 2018-07-11 RX ADMIN — DIVALPROEX SODIUM SCH MG: 500 TABLET, DELAYED RELEASE ORAL at 17:57

## 2018-07-11 RX ADMIN — OLANZAPINE SCH MG: 5 TABLET, ORALLY DISINTEGRATING ORAL at 22:00

## 2018-07-11 RX ADMIN — DIVALPROEX SODIUM SCH MG: 500 TABLET, DELAYED RELEASE ORAL at 09:45

## 2018-07-11 RX ADMIN — Medication SCH MG: at 08:48

## 2018-07-11 RX ADMIN — NICOTINE SCH MG: 14 PATCH TRANSDERMAL at 09:00

## 2018-07-11 RX ADMIN — Medication SCH MG: at 09:45

## 2018-07-11 NOTE — NUR
GPS OVERFLOW RN OPENING NOTE 



RECEIVED PT AOX2 SPEECH CLEAR, AGITATED, SITTER AT BEDSIDE, GUARDED,  NO S/SX OF CARDIAC OR 
RESPIRATORY DISTRESS, ON ROOM AIR, SAFETY MAINTAINED, BED IN LOCKED, LOW POSITION, CALL 
LIGHT WITHIN REACH, WILL CONTINUE TO MONITOR FOR ANY CHANGES IN CONDITION.

## 2018-07-11 NOTE — NUR
RN NOTES 

SEEN BY  WITH NO NEW ORDERERS.RECREATIONAL THERAPY DONE TOLD THAT PATIENT IS NOT 
FOCUSING .MAY NOT BE CONTINUE FURTHER.

## 2018-07-11 NOTE — NUR
GPS OVERFLOW RN SHIFT END NOTE 



 PT AOX2 SPEECH CLEAR,  SITTER AT BEDSIDE, GUARDED,  NO S/SX OF CARDIAC OR RESPIRATORY 
DISTRESS, ON ROOM AIR, SAFETY MAINTAINED, BED IN LOCKED, LOW POSITION, CALL LIGHT WITHIN 
REACH,ENDORSED TO PM NURSE FOR BRANDAN.

## 2018-07-11 NOTE — NUR
INITIAL DISCHARGE PLAN: Patient may  need placement, pt provided SW with a home address 
different than the address on his facesheet. Per pt, he does not have any family. SW will 
help form a safe and proper discharge in collaboration with MD.

## 2018-07-11 NOTE — NUR
GPS OVERFLOW RN NOTE 



RECEIVED PT AOX2 SPEECH CLEAR, AGITATED, SITTER AT BEDSIDE, GUARDED, NO S/SX OF CARDIAC OR 
RESPIRATORY DISTRESS, ON ROOM AIR,  SKIN KEPT CLEAN AND DRY. NO IV IN PLACE, PT REFUSED. 
SAFETY MAINTAINED AT ALL TIMES, BED IN LOCKED, LOW POSITION, CALL LIGHT WITHIN REACH, WILL 
CONTINUE TO MONITOR FOR ANY CHANGES IN CONDITION.

## 2018-07-12 VITALS — DIASTOLIC BLOOD PRESSURE: 56 MMHG | SYSTOLIC BLOOD PRESSURE: 90 MMHG

## 2018-07-12 VITALS — DIASTOLIC BLOOD PRESSURE: 50 MMHG | SYSTOLIC BLOOD PRESSURE: 91 MMHG

## 2018-07-12 VITALS — SYSTOLIC BLOOD PRESSURE: 91 MMHG | DIASTOLIC BLOOD PRESSURE: 50 MMHG

## 2018-07-12 RX ADMIN — DIVALPROEX SODIUM SCH MG: 500 TABLET, DELAYED RELEASE ORAL at 17:40

## 2018-07-12 RX ADMIN — DIVALPROEX SODIUM SCH MG: 500 TABLET, DELAYED RELEASE ORAL at 09:30

## 2018-07-12 RX ADMIN — OLANZAPINE SCH MG: 5 TABLET, ORALLY DISINTEGRATING ORAL at 22:00

## 2018-07-12 NOTE — NUR
GPS OVERFLOW RN AM NOTE 



RECEIVED PT AOX2 SPEECH CLEAR, SITTER AT BEDSIDE, GUARDED, ON 5250, [EXP 07/20],NO S/SX OF 
CARDIAC OR RESPIRATORY DISTRESS, ON ROOM AIR,  SKIN KEPT CLEAN AND DRY. NO IV IN PLACE, PT 
REFUSED. SAFETY MAINTAINED AT ALL TIMES, BED IN LOCKED, LOW POSITION, CALL LIGHT WITHIN 
REACH, WILL CONTINUE TO MONITOR FOR ANY CHANGES IN CONDITION.

## 2018-07-12 NOTE — NUR
pt refused zyprexa 5mg po. offer 3x. explain the risk and benefits of not taking his 
medications. pt still refused. will endorse to next shift nurse for collin.

## 2018-07-13 VITALS — DIASTOLIC BLOOD PRESSURE: 67 MMHG | SYSTOLIC BLOOD PRESSURE: 107 MMHG

## 2018-07-13 VITALS — DIASTOLIC BLOOD PRESSURE: 63 MMHG | SYSTOLIC BLOOD PRESSURE: 102 MMHG

## 2018-07-13 VITALS — SYSTOLIC BLOOD PRESSURE: 123 MMHG | DIASTOLIC BLOOD PRESSURE: 59 MMHG

## 2018-07-13 RX ADMIN — DIVALPROEX SODIUM SCH MG: 500 TABLET, DELAYED RELEASE ORAL at 17:46

## 2018-07-13 RX ADMIN — DIVALPROEX SODIUM SCH MG: 500 TABLET, DELAYED RELEASE ORAL at 09:05

## 2018-07-13 RX ADMIN — NICOTINE SCH MG: 14 PATCH TRANSDERMAL at 09:00

## 2018-07-13 RX ADMIN — TEMAZEPAM PRN MG: 7.5 CAPSULE ORAL at 22:41

## 2018-07-13 RX ADMIN — Medication SCH MG: at 09:00

## 2018-07-13 RX ADMIN — OLANZAPINE SCH MG: 5 TABLET, ORALLY DISINTEGRATING ORAL at 22:41

## 2018-07-13 NOTE — NUR
BK faxed SNF referral to [facility] admissions director at [facility] Address: [address] [place] [address] Phone: [phone] for review.

## 2018-07-14 VITALS — DIASTOLIC BLOOD PRESSURE: 64 MMHG | SYSTOLIC BLOOD PRESSURE: 130 MMHG

## 2018-07-14 VITALS — SYSTOLIC BLOOD PRESSURE: 99 MMHG | DIASTOLIC BLOOD PRESSURE: 54 MMHG

## 2018-07-14 VITALS — DIASTOLIC BLOOD PRESSURE: 53 MMHG | SYSTOLIC BLOOD PRESSURE: 103 MMHG

## 2018-07-14 RX ADMIN — DIVALPROEX SODIUM SCH MG: 500 TABLET, DELAYED RELEASE ORAL at 08:03

## 2018-07-14 RX ADMIN — DIVALPROEX SODIUM SCH MG: 500 TABLET, DELAYED RELEASE ORAL at 16:46

## 2018-07-14 RX ADMIN — TEMAZEPAM PRN MG: 7.5 CAPSULE ORAL at 22:43

## 2018-07-14 RX ADMIN — Medication SCH MG: at 08:52

## 2018-07-14 RX ADMIN — NICOTINE SCH MG: 14 PATCH TRANSDERMAL at 08:53

## 2018-07-14 RX ADMIN — OLANZAPINE SCH MG: 5 TABLET, ORALLY DISINTEGRATING ORAL at 22:41

## 2018-07-15 VITALS — SYSTOLIC BLOOD PRESSURE: 110 MMHG | DIASTOLIC BLOOD PRESSURE: 55 MMHG

## 2018-07-15 VITALS — SYSTOLIC BLOOD PRESSURE: 96 MMHG | DIASTOLIC BLOOD PRESSURE: 56 MMHG

## 2018-07-15 VITALS — SYSTOLIC BLOOD PRESSURE: 102 MMHG | DIASTOLIC BLOOD PRESSURE: 59 MMHG

## 2018-07-15 VITALS — SYSTOLIC BLOOD PRESSURE: 108 MMHG | DIASTOLIC BLOOD PRESSURE: 68 MMHG

## 2018-07-15 RX ADMIN — OLANZAPINE SCH MG: 5 TABLET, ORALLY DISINTEGRATING ORAL at 21:09

## 2018-07-15 RX ADMIN — NICOTINE SCH MG: 14 PATCH TRANSDERMAL at 08:35

## 2018-07-15 RX ADMIN — DIVALPROEX SODIUM SCH MG: 500 TABLET, DELAYED RELEASE ORAL at 08:31

## 2018-07-15 RX ADMIN — Medication SCH MG: at 08:35

## 2018-07-15 RX ADMIN — DIVALPROEX SODIUM SCH MG: 500 TABLET, DELAYED RELEASE ORAL at 16:10

## 2018-07-15 RX ADMIN — TEMAZEPAM PRN MG: 7.5 CAPSULE ORAL at 23:04

## 2018-07-16 VITALS — DIASTOLIC BLOOD PRESSURE: 71 MMHG | SYSTOLIC BLOOD PRESSURE: 117 MMHG

## 2018-07-16 VITALS — DIASTOLIC BLOOD PRESSURE: 71 MMHG | SYSTOLIC BLOOD PRESSURE: 112 MMHG

## 2018-07-16 VITALS — DIASTOLIC BLOOD PRESSURE: 61 MMHG | SYSTOLIC BLOOD PRESSURE: 103 MMHG

## 2018-07-16 RX ADMIN — DIVALPROEX SODIUM SCH MG: 500 TABLET, DELAYED RELEASE ORAL at 17:22

## 2018-07-16 RX ADMIN — DIVALPROEX SODIUM SCH MG: 500 TABLET, DELAYED RELEASE ORAL at 09:00

## 2018-07-16 RX ADMIN — TEMAZEPAM PRN MG: 7.5 CAPSULE ORAL at 21:36

## 2018-07-16 RX ADMIN — OLANZAPINE SCH MG: 5 TABLET, ORALLY DISINTEGRATING ORAL at 21:36

## 2018-07-16 RX ADMIN — NICOTINE SCH MG: 14 PATCH TRANSDERMAL at 09:00

## 2018-07-16 RX ADMIN — Medication SCH MG: at 09:00

## 2018-07-16 RX ADMIN — Medication SCH MG: at 09:58

## 2018-07-17 VITALS — DIASTOLIC BLOOD PRESSURE: 79 MMHG | SYSTOLIC BLOOD PRESSURE: 120 MMHG

## 2018-07-17 VITALS — SYSTOLIC BLOOD PRESSURE: 124 MMHG | DIASTOLIC BLOOD PRESSURE: 78 MMHG

## 2018-07-17 VITALS — SYSTOLIC BLOOD PRESSURE: 98 MMHG | DIASTOLIC BLOOD PRESSURE: 69 MMHG

## 2018-07-17 RX ADMIN — DIVALPROEX SODIUM SCH MG: 500 TABLET, DELAYED RELEASE ORAL at 16:21

## 2018-07-17 RX ADMIN — TEMAZEPAM PRN MG: 7.5 CAPSULE ORAL at 21:38

## 2018-07-17 RX ADMIN — OLANZAPINE SCH MG: 5 TABLET, ORALLY DISINTEGRATING ORAL at 21:36

## 2018-07-17 RX ADMIN — DIVALPROEX SODIUM SCH MG: 500 TABLET, DELAYED RELEASE ORAL at 09:03

## 2018-07-17 RX ADMIN — Medication SCH MG: at 09:00

## 2018-07-17 RX ADMIN — NICOTINE SCH MG: 14 PATCH TRANSDERMAL at 09:02

## 2018-07-18 VITALS — DIASTOLIC BLOOD PRESSURE: 68 MMHG | SYSTOLIC BLOOD PRESSURE: 106 MMHG

## 2018-07-18 RX ADMIN — NICOTINE SCH MG: 14 PATCH TRANSDERMAL at 09:01

## 2018-07-18 RX ADMIN — DIVALPROEX SODIUM SCH MG: 500 TABLET, DELAYED RELEASE ORAL at 09:01

## 2018-07-18 RX ADMIN — Medication SCH MG: at 09:00

## 2018-07-18 NOTE — NUR
DISCHARGE NOTE: Pt was discharged at 2:00pm via [facility] ambulance trip # [id number] to 
[facility] Address: [address] [place] 
[address] Phone: [phone]. Pt has no family to notify. Pt was in an anxious mood with 
congruent affect as he was eager to smoke. Pt denied suicidal/homicidal ideations and denied 
visual/auditory hallucinations. For smoking cessation, patient will be referred to the 
[employer] [phone] or [employer] 800-Lung-USA. Pt will be 
under the care of Psychiatrist: Dr. Abdul [facility] [facility] [address] [address] Phone: [phone] and Internist: Dr Jones Address: [address] [address] [phone]. The multidisciplinary exitcare form was done, 
printed, signed, and given to the patient.

## 2018-07-18 NOTE — NUR
pt. anxious to leave and a little upset he could not have a cigarette at noon,states someone 
promised him.offered pt. a klonopin and he refused.signed all dc papers,dc photos 
taken.denies suicide ideation and homicidal ideation, report to ernestina at transferring 
facility as well as drivers.belonging sheet signed,taken to facility via ambulance.

## 2019-07-16 NOTE — NUR
----- Message from FERNANDO Kent sent at 7/16/2019  2:45 PM CDT -----  Results noted, please fax to Dr Orozco's office for future consultation.   Results faxed over to Dr. Nesbitt office, [phone].   SW received phone call from [facility] admissions director at [facility] Address: [address] [place] [address] Phone: [phone] stating pt 
had been accepted to facility pending behavioral report at discharge. children/significant other